# Patient Record
Sex: FEMALE | Race: WHITE | NOT HISPANIC OR LATINO
[De-identification: names, ages, dates, MRNs, and addresses within clinical notes are randomized per-mention and may not be internally consistent; named-entity substitution may affect disease eponyms.]

---

## 2024-10-15 SDOH — ECONOMIC STABILITY: TRANSPORTATION INSECURITY
IN THE PAST 12 MONTHS, HAS LACK OF RELIABLE TRANSPORTATION KEPT YOU FROM MEDICAL APPOINTMENTS, MEETINGS, WORK OR FROM GETTING THINGS NEEDED FOR DAILY LIVING?: NO

## 2024-10-15 SDOH — ECONOMIC STABILITY: FOOD INSECURITY: WITHIN THE PAST 12 MONTHS, THE FOOD YOU BOUGHT JUST DIDN'T LAST AND YOU DIDN'T HAVE MONEY TO GET MORE.: SOMETIMES TRUE

## 2024-10-15 SDOH — ECONOMIC STABILITY: FOOD INSECURITY: WITHIN THE PAST 12 MONTHS, YOU WORRIED THAT YOUR FOOD WOULD RUN OUT BEFORE YOU GOT MONEY TO BUY MORE.: OFTEN TRUE

## 2024-10-15 SDOH — HEALTH STABILITY: PHYSICAL HEALTH: ON AVERAGE, HOW MANY MINUTES DO YOU ENGAGE IN EXERCISE AT THIS LEVEL?: 90 MIN

## 2024-10-15 SDOH — ECONOMIC STABILITY: INCOME INSECURITY: IN THE LAST 12 MONTHS, WAS THERE A TIME WHEN YOU WERE NOT ABLE TO PAY THE MORTGAGE OR RENT ON TIME?: YES

## 2024-10-15 SDOH — ECONOMIC STABILITY: TRANSPORTATION INSECURITY
IN THE PAST 12 MONTHS, HAS THE LACK OF TRANSPORTATION KEPT YOU FROM MEDICAL APPOINTMENTS OR FROM GETTING MEDICATIONS?: NO

## 2024-10-15 SDOH — HEALTH STABILITY: PHYSICAL HEALTH: ON AVERAGE, HOW MANY DAYS PER WEEK DO YOU ENGAGE IN MODERATE TO STRENUOUS EXERCISE (LIKE A BRISK WALK)?: 4 DAYS

## 2024-10-15 SDOH — ECONOMIC STABILITY: INCOME INSECURITY: HOW HARD IS IT FOR YOU TO PAY FOR THE VERY BASICS LIKE FOOD, HOUSING, MEDICAL CARE, AND HEATING?: VERY HARD

## 2024-10-15 SDOH — HEALTH STABILITY: MENTAL HEALTH
STRESS IS WHEN SOMEONE FEELS TENSE, NERVOUS, ANXIOUS, OR CAN'T SLEEP AT NIGHT BECAUSE THEIR MIND IS TROUBLED. HOW STRESSED ARE YOU?: RATHER MUCH

## 2024-10-15 SDOH — ECONOMIC STABILITY: TRANSPORTATION INSECURITY
IN THE PAST 12 MONTHS, HAS LACK OF TRANSPORTATION KEPT YOU FROM MEETINGS, WORK, OR FROM GETTING THINGS NEEDED FOR DAILY LIVING?: NO

## 2024-10-15 SDOH — ECONOMIC STABILITY: HOUSING INSECURITY
IN THE LAST 12 MONTHS, WAS THERE A TIME WHEN YOU DID NOT HAVE A STEADY PLACE TO SLEEP OR SLEPT IN A SHELTER (INCLUDING NOW)?: YES

## 2024-10-15 ASSESSMENT — LIFESTYLE VARIABLES
HOW MANY STANDARD DRINKS CONTAINING ALCOHOL DO YOU HAVE ON A TYPICAL DAY: 3 OR 4
HOW OFTEN DO YOU HAVE A DRINK CONTAINING ALCOHOL: 2-4 TIMES A MONTH
SKIP TO QUESTIONS 9-10: 0
HOW OFTEN DO YOU HAVE SIX OR MORE DRINKS ON ONE OCCASION: LESS THAN MONTHLY
AUDIT-C TOTAL SCORE: 4

## 2024-10-15 ASSESSMENT — SOCIAL DETERMINANTS OF HEALTH (SDOH)
HOW OFTEN DO YOU ATTEND CHURCH OR RELIGIOUS SERVICES?: NEVER
DO YOU BELONG TO ANY CLUBS OR ORGANIZATIONS SUCH AS CHURCH GROUPS UNIONS, FRATERNAL OR ATHLETIC GROUPS, OR SCHOOL GROUPS?: NO
HOW OFTEN DO YOU ATTEND CHURCH OR RELIGIOUS SERVICES?: NEVER
WITHIN THE PAST 12 MONTHS, YOU WORRIED THAT YOUR FOOD WOULD RUN OUT BEFORE YOU GOT THE MONEY TO BUY MORE: OFTEN TRUE
HOW MANY DRINKS CONTAINING ALCOHOL DO YOU HAVE ON A TYPICAL DAY WHEN YOU ARE DRINKING: 3 OR 4
IN A TYPICAL WEEK, HOW MANY TIMES DO YOU TALK ON THE PHONE WITH FAMILY, FRIENDS, OR NEIGHBORS?: THREE TIMES A WEEK
HOW OFTEN DO YOU GET TOGETHER WITH FRIENDS OR RELATIVES?: ONCE A WEEK
HOW OFTEN DO YOU GET TOGETHER WITH FRIENDS OR RELATIVES?: ONCE A WEEK
HOW OFTEN DO YOU ATTENT MEETINGS OF THE CLUB OR ORGANIZATION YOU BELONG TO?: NEVER
HOW OFTEN DO YOU ATTENT MEETINGS OF THE CLUB OR ORGANIZATION YOU BELONG TO?: NEVER
HOW OFTEN DO YOU HAVE SIX OR MORE DRINKS ON ONE OCCASION: LESS THAN MONTHLY
DO YOU BELONG TO ANY CLUBS OR ORGANIZATIONS SUCH AS CHURCH GROUPS UNIONS, FRATERNAL OR ATHLETIC GROUPS, OR SCHOOL GROUPS?: NO
IN A TYPICAL WEEK, HOW MANY TIMES DO YOU TALK ON THE PHONE WITH FAMILY, FRIENDS, OR NEIGHBORS?: THREE TIMES A WEEK
IN THE PAST 12 MONTHS, HAS THE ELECTRIC, GAS, OIL, OR WATER COMPANY THREATENED TO SHUT OFF SERVICE IN YOUR HOME?: YES
HOW OFTEN DO YOU HAVE A DRINK CONTAINING ALCOHOL: 2-4 TIMES A MONTH
HOW HARD IS IT FOR YOU TO PAY FOR THE VERY BASICS LIKE FOOD, HOUSING, MEDICAL CARE, AND HEATING?: VERY HARD

## 2024-10-18 ENCOUNTER — OFFICE VISIT (OUTPATIENT)
Dept: MEDICAL GROUP | Facility: CLINIC | Age: 44
End: 2024-10-18
Payer: COMMERCIAL

## 2024-10-18 VITALS
TEMPERATURE: 97.7 F | SYSTOLIC BLOOD PRESSURE: 140 MMHG | OXYGEN SATURATION: 97 % | DIASTOLIC BLOOD PRESSURE: 79 MMHG | BODY MASS INDEX: 20.72 KG/M2 | WEIGHT: 132 LBS | HEIGHT: 67 IN | HEART RATE: 84 BPM

## 2024-10-18 DIAGNOSIS — Z12.31 ENCOUNTER FOR SCREENING MAMMOGRAM FOR MALIGNANT NEOPLASM OF BREAST: ICD-10-CM

## 2024-10-18 DIAGNOSIS — M54.50 CHRONIC MIDLINE LOW BACK PAIN WITHOUT SCIATICA: ICD-10-CM

## 2024-10-18 DIAGNOSIS — F99 MENTAL HEALTH DISORDER: ICD-10-CM

## 2024-10-18 DIAGNOSIS — Z80.9 FAMILY HISTORY OF CANCER: ICD-10-CM

## 2024-10-18 DIAGNOSIS — Z11.59 ENCOUNTER FOR HEPATITIS C SCREENING TEST FOR LOW RISK PATIENT: ICD-10-CM

## 2024-10-18 DIAGNOSIS — G89.29 CHRONIC MIDLINE LOW BACK PAIN WITHOUT SCIATICA: ICD-10-CM

## 2024-10-18 PROCEDURE — 99214 OFFICE O/P EST MOD 30 MIN: CPT | Mod: GC | Performed by: BEHAVIOR ANALYST

## 2024-10-18 RX ORDER — OXCARBAZEPINE 600 MG/1
600 TABLET, FILM COATED ORAL 2 TIMES DAILY
COMMUNITY

## 2024-10-18 RX ORDER — VALACYCLOVIR HYDROCHLORIDE 1 G/1
1000 TABLET, FILM COATED ORAL 2 TIMES DAILY
COMMUNITY

## 2024-10-18 RX ORDER — NAPROXEN 500 MG/1
500 TABLET ORAL 2 TIMES DAILY WITH MEALS
COMMUNITY

## 2024-10-18 RX ORDER — DROSPIRENONE AND ETHINYL ESTRADIOL 0.02-3(28)
1 KIT ORAL DAILY
COMMUNITY

## 2024-10-18 RX ORDER — PROPRANOLOL HCL 20 MG
TABLET ORAL
COMMUNITY
Start: 2023-01-01

## 2024-10-18 RX ORDER — TRETINOIN 0.25 MG/G
CREAM TOPICAL NIGHTLY
COMMUNITY

## 2024-10-18 RX ORDER — CLONAZEPAM 0.5 MG/1
TABLET ORAL 2 TIMES DAILY
COMMUNITY

## 2024-10-18 RX ORDER — BUSPIRONE HYDROCHLORIDE 7.5 MG/1
7.5 TABLET ORAL 3 TIMES DAILY
COMMUNITY

## 2024-10-18 RX ORDER — BENZTROPINE MESYLATE 0.5 MG/1
0.5 TABLET ORAL 2 TIMES DAILY
COMMUNITY

## 2024-10-18 RX ORDER — CYCLOBENZAPRINE HCL 10 MG
10 TABLET ORAL 3 TIMES DAILY PRN
COMMUNITY

## 2024-10-18 RX ORDER — HYDROXYZINE HYDROCHLORIDE 25 MG/1
25 TABLET, FILM COATED ORAL 3 TIMES DAILY PRN
COMMUNITY

## 2024-10-18 RX ORDER — TRAZODONE HYDROCHLORIDE 50 MG/1
50 TABLET, FILM COATED ORAL NIGHTLY
COMMUNITY

## 2024-10-18 ASSESSMENT — PATIENT HEALTH QUESTIONNAIRE - PHQ9
CLINICAL INTERPRETATION OF PHQ2 SCORE: 2
SUM OF ALL RESPONSES TO PHQ QUESTIONS 1-9: 10
5. POOR APPETITE OR OVEREATING: 1 - SEVERAL DAYS

## 2024-12-06 ENCOUNTER — OFFICE VISIT (OUTPATIENT)
Dept: MEDICAL GROUP | Facility: CLINIC | Age: 44
End: 2024-12-06
Payer: COMMERCIAL

## 2024-12-06 VITALS
HEART RATE: 96 BPM | DIASTOLIC BLOOD PRESSURE: 74 MMHG | SYSTOLIC BLOOD PRESSURE: 112 MMHG | WEIGHT: 129.5 LBS | OXYGEN SATURATION: 99 % | HEIGHT: 67 IN | TEMPERATURE: 97.4 F | BODY MASS INDEX: 20.33 KG/M2

## 2024-12-06 DIAGNOSIS — Z30.9 ENCOUNTER FOR CONTRACEPTIVE MANAGEMENT, UNSPECIFIED TYPE: ICD-10-CM

## 2024-12-06 PROCEDURE — 99213 OFFICE O/P EST LOW 20 MIN: CPT | Mod: GE | Performed by: BEHAVIOR ANALYST

## 2024-12-06 PROCEDURE — 3074F SYST BP LT 130 MM HG: CPT | Mod: GC | Performed by: BEHAVIOR ANALYST

## 2024-12-06 PROCEDURE — 3078F DIAST BP <80 MM HG: CPT | Mod: GC | Performed by: BEHAVIOR ANALYST

## 2024-12-06 RX ORDER — DROSPIRENONE AND ETHINYL ESTRADIOL 0.02-3(28)
1 KIT ORAL DAILY
Qty: 28 TABLET | Refills: 11 | Status: SHIPPED | OUTPATIENT
Start: 2024-12-06

## 2024-12-06 RX ORDER — GABAPENTIN 300 MG/1
600 CAPSULE ORAL 2 TIMES DAILY
COMMUNITY
Start: 2024-09-23

## 2024-12-06 NOTE — PROGRESS NOTES
"Subjective:     CC: refill BC    HPI:   Giovanna is a 44 y.o. female who presents today for below concern.     Assessment:  Problem   Encounter for Contraceptive Management    -pt req refill on birth control  -has no concerns  -not recently sexually active  -periods well regulated on BC  -last pap in past year    Plan:  -refill BC         All plans of care were fully discussed with the patient and shared-decision making was utilized to conclude best course of actions in patient's medical management.    ROS: See HPI.     Objective:     Exam:  /74   Pulse 96   Temp 36.3 °C (97.4 °F) (Temporal)   Ht 1.702 m (5' 7\")   Wt 58.7 kg (129 lb 8 oz)   SpO2 99%   BMI 20.28 kg/m²  Body mass index is 20.28 kg/m².    Physical Exam:  General: Pt resting in NAD, cooperative   Skin:  No cyanosis or jaundice   HEENT: NC/AT. EOMI. No conjunctival injection or sclera icterus.   Extremities:  No LE edema   CNS:  No gross focal neurologic deficits  Psych: Appropriate mood and affect         Assessment & Plan:     See A/P under Subjective Section above    Problem List Items Addressed This Visit       Encounter for contraceptive management    Relevant Medications    drospirenone-ethinyl estradiol (JASE) 3-0.02 MG per tablet               "

## 2024-12-11 ENCOUNTER — RESEARCH ENCOUNTER (OUTPATIENT)
Dept: RESEARCH | Facility: MEDICAL CENTER | Age: 44
End: 2024-12-11

## 2024-12-11 NOTE — RESEARCH NOTE
Patient has been referred by Scott Church. The initial referral follow-up message, which includes the consent form link, has been sent to the patient.    Eligible Studies: HNP

## 2025-01-08 ENCOUNTER — TELEMEDICINE (OUTPATIENT)
Dept: BEHAVIORAL HEALTH | Facility: CLINIC | Age: 45
End: 2025-01-08
Payer: COMMERCIAL

## 2025-01-08 DIAGNOSIS — F31.71 BIPOLAR DISORDER, IN PARTIAL REMISSION, MOST RECENT EPISODE HYPOMANIC (HCC): ICD-10-CM

## 2025-01-08 DIAGNOSIS — F43.12 PROLONGED POSTTRAUMATIC STRESS DISORDER: ICD-10-CM

## 2025-01-08 PROCEDURE — 90791 PSYCH DIAGNOSTIC EVALUATION: CPT | Performed by: PSYCHOLOGIST

## 2025-01-09 PROBLEM — F43.12 PROLONGED POSTTRAUMATIC STRESS DISORDER: Status: ACTIVE | Noted: 2025-01-09
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
## 2025-02-03 ENCOUNTER — APPOINTMENT (OUTPATIENT)
Dept: BEHAVIORAL HEALTH | Facility: CLINIC | Age: 45
End: 2025-02-03
Payer: COMMERCIAL

## 2025-02-03 DIAGNOSIS — F43.12 PROLONGED POSTTRAUMATIC STRESS DISORDER: ICD-10-CM

## 2025-02-03 DIAGNOSIS — F31.71 BIPOLAR DISORDER, IN PARTIAL REMISSION, MOST RECENT EPISODE HYPOMANIC (HCC): ICD-10-CM

## 2025-02-04 NOTE — PROGRESS NOTES
Name: Giovanna Fonseca Date: 2/3/25 : 82 Time in session 60 minutes   [] Initial Dx Eval [] Family [] Cancelled/Rescheduled [] Office visit   [x] Individual [] Group [] Late Cancellation [] Virtual Session   [] Couple [] Testing [] No call/No show [] Late   [] Discharge [] Phone [x] On time: 3:00 PM []  (2)   Attended: Ms. Fonseca   Observations/ Appearance/ Affect: Ms. Fonseca appeared clean, well-groomed, and dressed in casual clothes. She was oriented to person, place, time, and purpose, was pleasant and cooperative, lucid, and articulate. Her affect was anxious, and mood was sad. No suicidal or homicidal ideation described or reported. No reports of hallucinations or confusions. She participated well in this session.   Content/ Topics: Ms. Fonseca reported that she was doing well. She reported that she was talking with an old boyfriend, and they were thinking about getting back together. She reported that he lives in [place]. She reported that she maintained her sobriety. She reported that she was interested in using EMDR.   Risk issues assessed: Discussed with Ms. Fonseca strategies to decrease anxiety and increase safety. Discussed how Polyvagal Theory explains how there are shifts in physiological and behavioral states that distort social awareness and establish habitual defensive reactions that replace appropriate spontaneous social behavior. Discussed with her how over the next several sessions she will learn how the body is wired for survival and connection. Discussed how the autonomic nervous system works as our personal surveillance system, always on guard, always alert to the question: “is it safe?” The work of the autonomic nervous system is to protect us by looking for cues of safety and risk, sensing moment to moment of what is happening in and around our bodies and in the connections, we have to others. Discussed how over the next several sessions we will talk more about how this system  works and can inform us about how the state of the nervous system sets the table for how we will respond, act, and feel. Discussed with this person how ventral exercises help a person to be in a safe ventral state and be mindful. Discussed how a deep breath, a sigh, and a stretch are examples of ventral exercises and self-regulation. Encouraged her to be aware of her feelings of safety and connection here at [facility].   Psychosocial and environmental problems addressed: Discussed with Ms. Fonseca about Eye Movement Desensitization and Reprocessing (EMDR) as an approach to overcoming anxiety, stress, and trauma (Jennifer Javier, 1979). Discussed EMDR and how it was an Information Processing Therapy that used an eight-phase approach. Discussed how in the first phase history taking sessions are done to assess her readiness for EMDR and to develop a treatment plan. Discussed how the work would be to identify possible targets for EMDR processing. These include recent distressing events, current situations that elicit emotional disturbance, related historical incidents, and the development of specific skills and behaviors that will be needed by her in future situations. Discussed how during the second phase of treatment, this psychologist will work to ensure that she has adequate methods of handling emotional distress and good coping skills, and that she is in a relatively stable state. If further stabilization is required, or if additional skills are needed, therapy focuses on providing these. Discussed how she would then be able to use stress reducing techniques whenever necessary, during or between sessions. However, one goal is not to need these techniques once therapy is complete. Discussed how in phase three through six, a target is identified and processed using EMDR procedures. These involve her identifying the most vivid visual image related to the memory (if available), a negative belief about self, related  emotions, and body sensations. She would also identify a preferred positive belief. The validity of the positive belief is rated, as is the intensity of the negative emotions. In phase seven, closure, this psychologist would ask her to keep a journal during the week to document any related material that may arise and remind her of the self-calming activities that were mastered in phase two. Discussed how the next session begins with phase eight, re-evaluation of the previous work, and of progress since the previous session. Discussed how EMDR treatment ensures processing of all related historical events, current incidents that elicit distress and future scenarios that will require different responses. The overall goal is producing the most comprehensive and profound treatment effects in the shortest period of time, while simultaneously maintaining a stable client within a balanced system. Discussed how after EMDR processing, clients generally report that the emotional distress related to the memory has been eliminated, or greatly decreased, and that they have gained important cognitive insights. Importantly, these emotional and cognitive changes usually result in spontaneous behavioral and personal change, which are further enhanced with standard EMDR procedures. EMDR could be helpful. Before EMDR can be used she must gain a thorough understanding of this approach. Discussed how she must first identify and learn to access positive resources in her life, develop a safe place exercise using relaxation and stress reduction exercises. Discussed how EMDR was a client centered approach.   Current GAF: 55   Current medications: JASE, Neurontin   Significant/ Recent Events: Discussed with Ms. Fonseca how she might consider working in “The Relaxation and Stress Reduction Workbook, 7th edition” by Nathalie Hurtado, Kianna Humphries and Jamie Chavez. Discussed how this workbook teaches clinically proven stress-management and  relaxation techniques. Discussed how these skills are effective if they are practiced daily as part of your lifestyle. Discussed how the vagus nerve helps to regulate the heart and works to speed up or slow down our heart rate and effectively respond to any given moment. Discussed how this is called the vagal brake. It is this interaction between the parasympathetic and sympathetic nervous systems. Parasympathetic energy keeps the nervous system charged and the brake allows for more of the sympathetic energy into the system. Discussed how our breath helps us manage this brake. Using guided imagery to find and access a Safe Place is a way of exercising control of the vagal brake through relaxing breathing. Anabel Espinoza reminds us that every breath cycle is the vagal brake at work. By using your Safe Place, you are connecting to your connection with this brake and its built-in pattern of relaxation on the inhalation and reengagement on the exhalation. This braking action brings flexibility to our responses and a sense of ease to transitions. Discussed how affirmations were helpful and skillful thoughts that constructively and effectively change the tone and purpose of an action. Described how affirmations were brief, positively worded statements in the “present tense.” Discussed how by staying calm she was able to safely connect to others. As a relaxation exercise, she used bilateral stimulation and the imagery of a cleansing vortex to decrease tensions, negative thoughts and feelings and initiate the relaxation response. She was encouraged to develop her safe place and her positive resources that she would use to distract herself from any disturbing material, to use her vagal brake, to use mindful self-compassion daily and to use her affirmations to gain restful sleep, eat healthy, and to use affirmations easily. In that state of relaxation, using bilateral stimulation, discussed how she would focus on her breathing as a  meditation to relax, and was reminded how breathing is the fundamental necessity of life that most people take for granted. She used bilateral stimulation and practiced diaphragmatic or abdominal breathing, focusing on her breath for three minutes in this session, to let go of tension and lower anxieties. She agreed to practice diaphragmatic breathing by focusing on her breath at least three minutes a day and especially at the first sign of nervousness or anxiety. Reminded her how beginning with an exhale will allow the lungs to open up and create the feeling that that there is plenty of room to take in a good breath at the first intake of air. Inhaling and exhaling through the nose will slow it down and prevent hyperventilation. Make sure your exhale is longer than your inhale. Remember slow, deep, abdominal breaths (Yandel Hurtado and Scott). Discussed the work of Cee Benjamin and encouraged her to make some of her recipes in her book, “Eat Right, Feel Right.” She has recipes that reduce inflammation which occurs with depression, support vital energy (especially the adrenal glands), and reduce fatigue. Discussed how Cee reminds us that we need nourishment to pay attention to and to focus for sustained periods. Discussed how Cee reminds us that difficulty sleeping is not just about difficulty falling asleep, but it may be about waking up in the middle of the night. She has some recipes that can help with each. Discussed how Cee has recipes in her book that would encourage her to eat foods that are calming and to eliminate foods that are stimulating. She reminds people that food preparation is one of the greatest self-care efforts we can make. Discussed the work of Alina Mckenna and her workbook, “Mindful Self-Compassion” and to exercise mindful self-compassion as needed. Encouraged her to learn more about Polyvagal Theory and to consider the work of Anabel Espinoza. Discussed looking at [email or web address] and considering  nutritional supplements to increase the nutritional values in her diet. Discussed how Polyvagal Theory, Positive Psychology (Mindful Self-Compassion), Cognitive Behavioral Theory, Information Processing Theory, Relaxation and Stress Reduction Research, and Mental Health Nutrition would inform this psychotherapy and how she will develop Cognitive Behavioral Skills and Strategies to decrease and manage anxieties and fears, lift depression, gain control over her life, develop a healthier lifestyle, increase restful sleep, and find meaning and balance in her life. She asked to return to [facility] weekly to develop this supportive psychotherapy. Discussed how this psychotherapy would focus on her health, wellbeing, and progress at her pace and in a positive direction.   Informed consent issues discussed: Ms. Fonseca reported that she understood the process of this evaluation and agreed to return to [facility]. Discussed how she expressed her desire to change and was willing to start the process. She reported that she was willing to make changes to her life toward a healthy lifestyle. She reported that she was aware of the problems she experienced and expressed the desire to solve those problems safely. She reported that she had a positive outlook for change. She reported that she had family and friends that were supportive and knew she needed to gain and maintain a healthy network of support.    Assignments/ Homework: Ms. Fonseca agreed to initiate some of the strategies discussed today to decrease anxieties and increase restful sleep and increase nutritional values in her diet.    Plan: Ms. Fonseca agreed to monitor closely her mood and behavior.    Diagnoses: F31.9 Bipolar Disorder (by history): F43.12 Posttraumatic Stress Disorder [] Provisional   Treatment Plan: [x] Continued [] New [] Replaced Referral:   Suicidal [] Yes [x] No [] Medical:    Violence: [] Yes [x] No [] Psychiatric:    Next session:     [] Neurological:             Rad Moya Psy.D.  Clinical Psychologist  [facility]  NPI: [id number]

## 2025-02-27 ENCOUNTER — APPOINTMENT (OUTPATIENT)
Dept: RADIOLOGY | Facility: MEDICAL CENTER | Age: 45
End: 2025-02-27
Attending: BEHAVIOR ANALYST
Payer: COMMERCIAL

## 2025-02-27 ENCOUNTER — APPOINTMENT (OUTPATIENT)
Dept: BEHAVIORAL HEALTH | Facility: CLINIC | Age: 45
End: 2025-02-27
Payer: COMMERCIAL

## 2025-02-27 DIAGNOSIS — Z12.31 ENCOUNTER FOR SCREENING MAMMOGRAM FOR MALIGNANT NEOPLASM OF BREAST: ICD-10-CM

## 2025-02-27 PROCEDURE — 77067 SCR MAMMO BI INCL CAD: CPT

## 2025-02-28 ENCOUNTER — RESEARCH ENCOUNTER (OUTPATIENT)
Dept: RESEARCH | Facility: MEDICAL CENTER | Age: 45
End: 2025-02-28
Payer: COMMERCIAL

## 2025-02-28 DIAGNOSIS — Z00.6 RESEARCH STUDY PATIENT: ICD-10-CM

## 2025-03-06 ENCOUNTER — HOSPITAL ENCOUNTER (OUTPATIENT)
Facility: MEDICAL CENTER | Age: 45
End: 2025-03-06
Attending: BEHAVIOR ANALYST
Payer: COMMERCIAL

## 2025-03-06 ENCOUNTER — HOSPITAL ENCOUNTER (OUTPATIENT)
Facility: MEDICAL CENTER | Age: 45
End: 2025-03-06
Attending: FAMILY MEDICINE

## 2025-03-06 DIAGNOSIS — Z11.59 ENCOUNTER FOR HEPATITIS C SCREENING TEST FOR LOW RISK PATIENT: ICD-10-CM

## 2025-03-06 DIAGNOSIS — Z00.6 RESEARCH STUDY PATIENT: ICD-10-CM

## 2025-03-06 LAB — HCV AB SER QL: NORMAL

## 2025-03-06 PROCEDURE — 86803 HEPATITIS C AB TEST: CPT

## 2025-03-06 PROCEDURE — 36415 COLL VENOUS BLD VENIPUNCTURE: CPT

## 2025-03-13 ENCOUNTER — APPOINTMENT (OUTPATIENT)
Dept: BEHAVIORAL HEALTH | Facility: CLINIC | Age: 45
End: 2025-03-13
Payer: COMMERCIAL

## 2025-03-17 ENCOUNTER — RESULTS FOLLOW-UP (OUTPATIENT)
Dept: MEDICAL GROUP | Facility: PHYSICIAN GROUP | Age: 45
End: 2025-03-17
Payer: COMMERCIAL

## 2025-03-17 LAB
APOB+LDLR+PCSK9 GENE MUT ANL BLD/T: NOT DETECTED
BRCA1+BRCA2 DEL+DUP + FULL MUT ANL BLD/T: NOT DETECTED
MLH1+MSH2+MSH6+PMS2 GN DEL+DUP+FUL M: NOT DETECTED

## 2025-03-28 ENCOUNTER — APPOINTMENT (OUTPATIENT)
Dept: BEHAVIORAL HEALTH | Facility: CLINIC | Age: 45
End: 2025-03-28
Payer: COMMERCIAL

## 2025-03-28 DIAGNOSIS — F31.71 BIPOLAR DISORDER, IN PARTIAL REMISSION, MOST RECENT EPISODE HYPOMANIC (HCC): ICD-10-CM

## 2025-03-28 DIAGNOSIS — F43.12 PROLONGED POSTTRAUMATIC STRESS DISORDER: ICD-10-CM

## 2025-03-28 PROCEDURE — 90837 PSYTX W PT 60 MINUTES: CPT | Performed by: PSYCHOLOGIST

## 2025-03-31 NOTE — PROGRESS NOTES
Name: Giovanna Fonseca Date: 3/28/25 : 82 Time in session 60 minutes   [] Initial Dx Eval [] Family [] Cancelled/Rescheduled [] Office visit   [x] Individual [] Group [] Late Cancellation [] Virtual Session   [] Couple [] Testing [] No call/No show [] Late   [] Discharge [] Phone [x] On time: 3:00 PM []  (3)   Attended: Ms. Fonseca   Observations/ Appearance/ Affect: Ms. Fonseca appeared clean, well-groomed, and dressed in casual clothes. She was oriented to person, place, time, and purpose, was pleasant and cooperative, lucid, and articulate. Her affect was anxious, and mood was sad. No suicidal or homicidal ideation described or reported. No reports of hallucinations or confusions. She participated well in this session.   Content/ Topics: Ms. Fonseca reported that she was doing well. She reported that was stressed by family problems. 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 She reported that she had a terrible sinus infection for a while but was better now. She reported that she was working and doing okay at work.   Risk issues assessed: Discussed with Ms. Fonseca strategies to decrease anxiety and increase safety. Discussed how Polyvagal Theory explains how there are shifts in physiological and behavioral states that distort social awareness and establish habitual defensive reactions that replace appropriate spontaneous social behavior. Discussed with her how the body is wired for survival and connection. Discussed how the autonomic nervous system works as our personal surveillance system, always on guard, always alert to the question: “is it safe?” The work of the autonomic nervous system is to protect us by looking for cues of safety and risk, sensing moment to moment of what is happening in and around our bodies and in the connections, we have to others. Discussed how this system works and can inform us about how the state of the nervous system  sets the table for how we will respond, act, and feel. Discussed with Ms. Fonseca how ventral exercises help a person to be in a safe ventral state and be mindful. Discussed how a deep breath, a sigh, and a stretch are examples of ventral exercises and self-regulation. Encouraged her to be aware of her feelings of safety and connection here at [facility].   Psychosocial and environmental problems addressed: Discussed how EMDR treatment ensures processing of all related historical events, current incidents that elicit distress and future scenarios that will require different responses. The overall goal is producing the most comprehensive and profound treatment effects in the shortest period of time, while simultaneously maintaining a stable client within a balanced system. Discussed creating a “safe place” in her mind where she could safely imagine and rid herself of any disturbance and where she would feel 100% safe. She reported that she had a place in mind. She described it being on her [place]parent's beach in [place], [place]. Discussed developing Positive Resources that she would use to calm and relax herself and distract herself from any disturbing material. She reported that she would play with her cat, listen to music, dance, go for a walk, make something (her hobbies), make a video, clean something, or cook something. She reported that she wanted to use EMDR as an approach to safely decreasing anxieties and posttraumatic stress. This person agreed to practice a meditation out of, “The Relaxation and Stress Reduction Workbook, 7th edition” by Nathalie Hurtado, Kianna Humphries and Jamie Chavez. Discussed how this workbook teaches clinically proven stress-management and relaxation techniques. Discussed how these skills are effective if they are practiced daily as part of your lifestyle. She was informed that breathing can reduce or eliminate symptoms of stress. Breath awareness and good breathing habits will  enhance psychological and physical well-being. She was informed how breathing works and how it is vital to sustaining life. Discussed how she would practice abdominal or diaphragmatic breathing and how it is natural, rhythmic, and relaxing, becomes deeper, and slower and is the easiest way to elicit the relaxation response. This person used bilateral stimulation and used the imagery of a cleansing vortex to relax and rid herself of any tension, tightness, or unusual sensations. Then she engaged in a breathing meditation the asked her to focus her thoughts on her breathing. She focused on her breathing and used breathing to increase awareness of her inner experiences, to release tensions and relax. She practiced this breathing meditation for 5 minutes. Reminded her how the relaxation response it the opposite of the stress response. By engaging in diaphragmatic breathing and initiating the relaxation response, the state of relaxation dismantles the stress response. Discussed how meditation is the practice of slowing down and focusing the mind on the present moment. Meditation generally takes place in a seated position with the eyes closed. The practice can last for as little as 5 minutes or go on for hours. Regardless of the time limit, the purpose of the practice is to prevent the mind from wandering. She focused on her breathing and on her wellbeing. In this state of relaxation she enhanced her safe place (on her grandparent's beach in [place], on a jasmyn, warm afternoon, a green flag day, listening to seagulls and waves breaking on the shore break) and her positive resources (she would use to calm and relax herself and distract herself from any disturbing material. She reported that she would play with her cat, listen to music, dance, go for a walk, make something (her hobbies), make a video, clean something or cook something), to distract herself from any disturbing material, to use her ventral exercises, to  use mindful self-compassion daily and to use her affirmations to gain restful sleep, eat healthy, and to use this breathing meditation often.   Current GAF: 55   Current medications: JASE, Neurontin   Significant/ Recent Events: Discussed the work of Cee Benjamin and encouraged her to make some of her recipes in her book, “Eat Right, Feel Right.” She has recipes that reduce inflammation which occurs with depression, support vital energy (especially the adrenal glands), and reduce fatigue. Discussed how Cee reminds us that we need nourishment to pay attention to and to focus for sustained periods. Discussed how Cee reminds us that difficulty sleeping is not just about difficulty falling asleep, but it may be about waking up in the middle of the night. She has some recipes that can help with each. Discussed how Cee has recipes in her book that would encourage her to eat foods that are calming and to eliminate foods that are stimulating. She reminds people that food preparation is one of the greatest self-care efforts we can make. Discussed the work of Alina Mckenna and her workbook, “Mindful Self-Compassion” and to exercise mindful self-compassion as needed. Encouraged her to learn more about Polyvagal Theory and to consider the work of Anabel Espinoza. Discussed looking at [employer] and considering nutritional supplements to increase the nutritional values in her diet. Discussed how Polyvagal Theory, Positive Psychology (Mindful Self-Compassion), Cognitive Behavioral Theory, Information Processing Theory, Relaxation and Stress Reduction Research, and Mental Health Nutrition would inform this psychotherapy and how she will develop Cognitive Behavioral Skills and Strategies to decrease and manage anxieties and fears, lift depression, gain control over her life, develop a healthier lifestyle, increase restful sleep, and find meaning and balance in her life. She asked to return to [facility] weekly to develop this supportive  psychotherapy. Discussed how this psychotherapy would focus on her health, wellbeing, and progress at her pace and in a positive direction.   Informed consent issues discussed: Ms. Fonseca reported that she understood the process of this evaluation and agreed to return to [facility]. Discussed how she expressed her desire to change and was willing to start the process. She reported that she was willing to make changes to her life toward a healthy lifestyle. She reported that she was aware of the problems she experienced and expressed the desire to solve those problems safely. She reported that she had a positive outlook for change. She reported that she had family and friends that were supportive and knew she needed to gain and maintain a healthy network of support.    Assignments/ Homework: Ms. Fonseca agreed to initiate some of the strategies discussed today to decrease anxieties and increase restful sleep and increase nutritional values in her diet.    Plan: Ms. Fonseca agreed to monitor closely her mood and behavior.    Diagnoses: F31.9 Bipolar Disorder (by history): F43.12 Posttraumatic Stress Disorder [] Provisional   Treatment Plan: [x] Continued [] New [] Replaced Referral:   Suicidal [] Yes [x] No [] Medical:    Violence: [] Yes [x] No [] Psychiatric:    Next session:     [] Neurological:            Rad Moya Psy.D.  Clinical Psychologist  [facility]  NPI: [id number]

## 2025-04-17 ENCOUNTER — APPOINTMENT (OUTPATIENT)
Dept: URGENT CARE | Facility: CLINIC | Age: 45
End: 2025-04-17
Payer: COMMERCIAL

## 2025-04-17 ENCOUNTER — APPOINTMENT (OUTPATIENT)
Dept: TELEHEALTH | Facility: TELEMEDICINE | Age: 45
End: 2025-04-17
Payer: COMMERCIAL

## 2025-05-13 ENCOUNTER — OFFICE VISIT (OUTPATIENT)
Dept: BEHAVIORAL HEALTH | Facility: CLINIC | Age: 45
End: 2025-05-13
Payer: COMMERCIAL

## 2025-05-13 DIAGNOSIS — F31.71 BIPOLAR DISORDER, IN PARTIAL REMISSION, MOST RECENT EPISODE HYPOMANIC (HCC): Primary | ICD-10-CM

## 2025-05-13 DIAGNOSIS — F43.12 PROLONGED POSTTRAUMATIC STRESS DISORDER: ICD-10-CM

## 2025-05-13 PROCEDURE — 90837 PSYTX W PT 60 MINUTES: CPT | Performed by: PSYCHOLOGIST

## 2025-05-14 NOTE — PROGRESS NOTES
Name: Giovanna Fonseca Date: 25 : 81 Time in session 60 minutes   [] Initial Dx Eval [] Family [] Cancelled/Rescheduled [] Office visit   [x] Individual [] Group [] Late Cancellation [] Virtual Session   [] Couple [] Testing [] No call/No show [] Late   [] Discharge [] Phone [x] On time: 4:00 PM []  (4)   Attended: Ms. Fonseca   Observations/ Appearance/ Affect: Ms. Fonseca appeared clean, well-groomed, and dressed in casual clothes. She was oriented to person, place, time, and purpose was pleasant and cooperative, lucid, and articulate. Her affect was anxious, and mood was sad. No suicidal or homicidal ideation described or reported. No reports of hallucinations or confusions. She participated well in this session.   Content/ Topics: Ms. Fonseca reported that she was doing well. 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 She reported that she broke up with the dylan in [place] and was dating a person here in [place]. She reported that he was a good person, and he helped her get this job. She reported that she went down to [place] to visit with his friends. She reported that she was using her safe place and her positive resources. She reported that she was ready to start EMDR.   Risk issues assessed: Discussed with Ms. Fonseca strategies to decrease anxiety and increase safety. Discussed how Polyvagal Theory explains how there are shifts in physiological and behavioral states that distort social awareness and establish habitual defensive reactions that replace appropriate spontaneous social behavior. Discussed with her how the body is wired for survival and connection. Discussed how the autonomic nervous system works as our personal surveillance system, always on guard, always alert to the question: “is it safe?” The work of the autonomic nervous system is to protect us by looking for cues of safety and risk, sensing moment to moment of what is  happening in and around our bodies and in the connections, we have to others. Discussed how this system works and can inform us about how the state of the nervous system sets the table for how we will respond, act, and feel. Discussed with Ms. Fonseca, how ventral exercises help a person to be in a safe ventral state and be mindful. Discussed how a deep breath, a sigh, and a stretch are examples of ventral exercises and self-regulation. Encouraged her to be aware of her feelings of safety and connection here at [facility].   Psychosocial and environmental problems addressed: Ms. Fonseca expressed her interest in using Eye Movement Desensitization and Reprocessing (EMDR). Discussed her readiness to use EMDR. She reported that her life was stable and secure. She reported that she had close friends and felt safe in her community. She reported that she is able to identify and express feelings confidently. She reported that she has an easy temperament, was flexible and has self-confidence and self-worth. She reported that she felt safe and generally marvin well with adversity. She reported that she does not use alcohol. She reported that she was in good health. Using the Negative Cognition Questionnaire, she identified what Negative Cognitions describe negative beliefs she had about herself. She identified several: “I do not deserve love,” “I am a bad person,” “I am terrible,” “I am worthless,” “I am shameful,” “I am not lovable” “I am not good enough,” “I am stupid (not smart enough),” “I am insignificant,” “I am a disappointment,” “I deserve to die,” “I deserve to be miserable,” “I am ugly (my body is hateful),” “I did something wrong,” “I am in danger,” “It is not okay to feel (show) my emotions,” “I should have known better,” “I am inadequate,” “I deserve only bad things,” “I am not in control,” “I am powerless,” “I am a failure (will fail),” “I have to be perfect (please everyone),” “I am permanently damaged,” “I should  have done something,” “I cannot stand it,” “I do not deserve,” “I cannot stand up for myself,” “I am different (do not belong),” “I am weak,” “I cannot protect myself,” “I cannot get what I want,” “I cannot trust anyone,” “I cannot trust anyone,” and “I cannot let it out.”  She reported that when she thought about how an ex-boyfriend abandoned her, the words that go best with that incident that expresses her negative belief about herself now were, “I do not deserve love.” She reported that the emotions she feels now when she brings up that incident and those words, she felt anxiety, worthless, shameful, worry, and not lovable. She reported that when she brings up that incident and those words and those emotions, she feels them, “all over.” She reported that on a scale of 0 to 10, where 0 is no disturbance or neutral and 10 is the highest disturbance that she can imagine, how disturbing this feels to her right now, she reported that it was a 10. She reported that those words, “I do not deserve love,” reminded her of when another ex-boyfriend cheated on her. She reported that the emotions she feels now when she brings up that incident and those words, she felt anxiety, worthless, shameful, worry, and not lovable. She reported that when she brings up that incident and those words and those emotions, she feels them, “all over.” She reported that on a scale of 0 to 10, where 0 is no disturbance or neutral and 10 is the highest disturbance that she can imagine, how disturbing this feels to her right now, she reported that it was a 10. Discussed how this seemed an appropriate time to stop. Discussed how she worked hard to identify these negative cognitions and these events. Discussed how EMDR treatment ensures processing of all related historical events, current incidents that elicit distress and future scenarios that will require different responses. The overall goal is producing the most comprehensive and profound  treatment effects in the shortest period of time, while simultaneously maintaining a stable client within a balanced system. Discussed how after EMDR processing, clients generally report that the emotional distress related to the memory has been eliminated, or greatly decreased, and that they have gained important cognitive insights. Importantly, these emotional and cognitive changes usually result in spontaneous behavioral and personal change, which are further enhanced with standard EMDR procedures. Ms. Fonseca used bilateral stimulation and used the imagery of a cleansing vortex to relax and rid herself of any tension, tightness, or unusual sensations, any negative feeling, and any negative thoughts. She used bilateral stimulation to practice her breathing meditation to engage the relaxation response. She was reminded how breathing can reduce or eliminate symptoms of stress. Breath awareness and good breathing habits will enhance psychological and physical well-being. She was informed how breathing works and how it is vital to sustaining life. She practiced abdominal or diaphragmatic breathing and reminded her that it was natural, rhythmic, and relaxing, how it becomes progressively deeper and slower and is the easiest way to elicit the relaxation response. She focused on her breathing and used breathing to increase awareness of her inner experiences, to release tensions and relax. Reminded her how the relaxation response is the opposite of the stress response and by eliciting the relaxation response it dismantles the stress response. She focused on her breathing for five minutes, on her safe place (on her grandparent's beach in [place], on a jasmyn, warm afternoon, a green flag day, listening to seagulls and waves breaking on the shore break) and then on her positive resources (she would play with her cat, listen to music, dance, go for a walk, make something (her hobbies), make a video, clean something or  cook something) to distract herself from any disturbing material. She was reminded to use Mindful Self-Compassion and her affirmations and focus on her safety, her health and wellbeing.   Current GAF: 55   Current medications: JASE, Neurontin   Significant/ Recent Events: Discussed the work of Cee Sourav and encouraged her to make some of her recipes in her book, “Eat Right, Feel Right.” She has recipes that reduce inflammation which occurs with depression, support vital energy (especially the adrenal glands), and reduce fatigue. Discussed how Cee reminds us that we need nourishment to pay attention to and to focus for sustained periods. Discussed how Cee reminds us that difficulty sleeping is not just about difficulty falling asleep, but it may be about waking up in the middle of the night. She has some recipes that can help with each. Discussed how Cee has recipes in her book that would encourage her to eat foods that are calming and to eliminate foods that are stimulating. She reminds people that food preparation is one of the greatest self-care efforts we can make. Discussed the work of Alina Mckenna and her workbook, “Mindful Self-Compassion” and to exercise mindful self-compassion as needed. Encouraged her to learn more about Polyvagal Theory and to consider the work of Anabel Espinoza. Discussed looking at Brainmd.com and considering nutritional supplements to increase the nutritional values in her diet. Discussed how Polyvagal Theory, Positive Psychology (Mindful Self-Compassion), Cognitive Behavioral Theory, Information Processing Theory, Relaxation and Stress Reduction Research, and Mental Health Nutrition would inform this psychotherapy and how she will develop Cognitive Behavioral Skills and Strategies to decrease and manage anxieties and fears, lift depression, gain control over her life, develop a healthier lifestyle, increase restful sleep, and find meaning and balance in her life. She asked to  return to [facility] weekly to develop this supportive psychotherapy. Discussed how this psychotherapy would focus on her health, wellbeing, and progress at her pace and in a positive direction.   Informed consent issues discussed: Ms. Fonseca reported that she understood the process of this evaluation and agreed to return to [facility]. Discussed how she expressed her desire to change and was willing to start the process. She reported that she was willing to make changes to her life toward a healthy lifestyle. She reported that she was aware of the problems she experienced and expressed the desire to solve those problems safely. She reported that she had a positive outlook for change. She reported that she had family and friends that were supportive and knew she needed to gain and maintain a healthy network of support.    Assignments/ Homework: Ms. Fonseca agreed to initiate some of the strategies discussed today to decrease anxieties and increase restful sleep and increase nutritional values in her diet.    Plan: Ms. Fonseca agreed to monitor closely her mood and behavior.    Diagnoses: F31.9 Bipolar Disorder (by history): F43.12 Posttraumatic Stress Disorder [] Provisional   Treatment Plan: [x] Continued [] New [] Replaced Referral:   Suicidal [] Yes [x] No [] Medical:    Violence: [] Yes [x] No [] Psychiatric:    Next session:     [] Neurological:            Rad Moya Psy.D.  Clinical Psychologist  [facility]  NPI: [id number]

## 2025-05-27 ENCOUNTER — OFFICE VISIT (OUTPATIENT)
Dept: BEHAVIORAL HEALTH | Facility: CLINIC | Age: 45
End: 2025-05-27
Payer: COMMERCIAL

## 2025-05-27 DIAGNOSIS — F31.71 BIPOLAR DISORDER, IN PARTIAL REMISSION, MOST RECENT EPISODE HYPOMANIC (HCC): Primary | ICD-10-CM

## 2025-05-27 DIAGNOSIS — F43.12 PROLONGED POSTTRAUMATIC STRESS DISORDER: ICD-10-CM

## 2025-05-27 PROCEDURE — 90837 PSYTX W PT 60 MINUTES: CPT | Performed by: PSYCHOLOGIST

## 2025-05-28 NOTE — PROGRESS NOTES
Name: Giovanna Fonseca Date: 25 : 81 Time in session 60 minutes   [] Initial Dx Eval [] Family [] Cancelled/Rescheduled [] Office visit   [x] Individual [] Group [] Late Cancellation [] Virtual Session   [] Couple [] Testing [] No call/No show [] Late   [] Discharge [] Phone [x] On time: 4:00 PM []  (5)   Attended: Ms. Fonseca   Observations/ Appearance/ Affect: Ms. Fonseca appeared clean, well-groomed, and dressed in casual clothes. She was oriented to person, place, time, and purpose was pleasant and cooperative, lucid, and articulate. Her affect was anxious, and mood was sad. No suicidal or homicidal ideation described or reported. No reports of hallucinations or confusions. She participated well in this session.   Content/ Topics: Ms. Fonseca reported that she was doing okay. She reported that she experienced some mood swings over the past week. She reported that she forgot to use her safe place, but did use her positive resources. She reported that she wanted to try to identify a few more events from her past today.   Risk issues assessed: Discussed with Ms. Fonseca strategies to decrease anxiety and increase safety. Discussed how Polyvagal Theory explains how there are shifts in physiological and behavioral states that distort social awareness and establish habitual defensive reactions that replace appropriate spontaneous social behavior. Discussed with her how the body is wired for survival and connection. Discussed how the autonomic nervous system works as our personal surveillance system, always on guard, always alert to the question: “is it safe?” The work of the autonomic nervous system is to protect us by looking for cues of safety and risk, sensing moment to moment of what is happening in and around our bodies and in the connections, we have to others. Discussed how this system works and can inform us about how the state of the nervous system sets the table for how we will respond, act,  and feel. Discussed with Ms. Fonseca, how ventral exercises help a person to be in a safe ventral state and be mindful. Discussed how a deep breath, a sigh, and a stretch are examples of ventral exercises and self-regulation. Encouraged her to be aware of her feelings of safety and connection here at [facility].   Psychosocial and environmental problems addressed: Ms. Fonseca seemed anxious, and this psychologist suggested she would go to her safe place (on her grandparent's beach in [place], on a jasmyn, warm afternoon, a green flag day, listening to seagulls and waves breaking on the shore break), before she begins. She appeared tearful and struggled to gain her safe place. Discussed how in this session she would review and rehearse diaphragmatic breathing, her safe place, and positive resources, and remind herself to use ventral exercises to stay safe, to use mindful self-compassion, and use her affirmations. Reminded her how EMDR treatment ensures processing of all related historical events, current incidents that elicit distress and future scenarios that will require different responses. The overall goal is producing the most comprehensive and profound treatment effects in the shortest period of time, while simultaneously maintaining a stable client within a balanced system. Discussed how after EMDR processing, clients generally report that the emotional distress related to the memory has been eliminated, or greatly decreased, and that they have gained important cognitive insights. Importantly, these emotional and cognitive changes usually result in spontaneous behavioral and personal change, which are further enhanced with standard EMDR procedures. Reminded her that this psychotherapy goes forward safely at her pace. Ms. Fonseca used bilateral stimulation and used the imagery of a cleansing vortex to relax and rid herself of any tension, tightness, or unusual sensations, any negative feeling, and any negative  thoughts. She practiced breathing meditation and diaphragmatic breathing to engage the relaxation response. She was reminded how breathing can reduce or eliminate symptoms of stress. Breath awareness and good breathing habits will enhance psychological and physical well-being. She was informed how breathing works and how it is vital to sustaining life. She practiced abdominal or diaphragmatic breathing and reminded her that it was natural, rhythmic, and relaxing, how it becomes progressively deeper and slower and is the easiest way to elicit the relaxation response. She focused on her breathing and used breathing to increase awareness of her inner experiences, to release tensions and relax. Reminded her how the relaxation response is the opposite of the stress response and by eliciting the relaxation response it dismantles the stress response. She focused on her breathing for five minutes, on her safe place (on her grandparent's beach in [place], on a jasmyn, warm afternoon, a green flag day, listening to seagulls and waves breaking on the shore break) and then on her positive resources (she would play with her cat, listen to music, dance, go for a walk, make something (her hobbies), make a video, clean something or cook something) to distract herself from any disturbing material. She was reminded to use Mindful Self-Compassion and her affirmations and focus on her safety, her health and wellbeing. She reported that she felt better.   Current GAF: 55   Current medications: JASE, Neurontin   Significant/ Recent Events: Discussed with Ms. Fonseca The [employer] Performance Program in Sports (TOPPS), and how it is an award-winning applied research and training program focused on developing, evaluating, and training performance optimization programs for athletes. The program incorporates support systems from teammates, coaches, and/or family members. Goal-achievement in both sports and overall mental health/wellness is  conceptualized through optimal regulation of thoughts, emotions, and behaviors that are consistent with a positive outlook and state of well-being. Approaching mental wellness holistically, the optimization programs at [facility] are developed to be applicable to all aspects and levels of life performance for athletes with specialized skill sets in unique cultures. Although the program is focused on athletes, it is applicable to artists, business professionals, first responders, and others interested in optimizing their performance. [facility] has been successfully implemented in both recreational and elite populations, including world and national champions in sports, and professional artists. This psychologist found it helpful in motivating positive change in anyone by using the tool: the Motivational [place]. When we use Performance Optimization Theory the focus is on “Optimal thinking, doing, and perceiving” in the best way possible given available skills and resources while considering environmental demands. Discussed how when a person needs to do something they can use this technique to make it more likely they would get it done. The person asks themselves what they would like to do and then asks what they can do to optimize their effort to make it happen. This is called the focus statement. This statement is then sandwiched between two motivational statements. That is, find things that you can say to yourself to motivate you (motivational statements). Discussed how you can talk to yourself into optimizing your efforts by making your focused statement in between two motivational statements (the motivational sandwich). She reported that she understood this strategy and she would try it out. Discussed the work of Cee Benjamin and encouraged her to make some of her recipes in her book, “Eat Right, Feel Right.” She has recipes that reduce inflammation which occurs with depression, support vital energy (especially the  adrenal glands), and reduce fatigue. Discussed how Cee reminds us that we need nourishment to pay attention to and to focus for sustained periods. Discussed how Cee reminds us that difficulty sleeping is not just about difficulty falling asleep, but it may be about waking up in the middle of the night. She has some recipes that can help with each. Discussed how Cee has recipes in her book that would encourage her to eat foods that are calming and to eliminate foods that are stimulating. She reminds people that food preparation is one of the greatest self-care efforts we can make. Discussed the work of Alina Mckenna and her workbook, “Mindful Self-Compassion” and to exercise mindful self-compassion as needed. Encouraged her to learn more about Polyvagal Theory and to consider the work of Anabel Espinoza. Discussed looking at Brainmd.com and considering nutritional supplements to increase the nutritional values in her diet. Discussed how Polyvagal Theory, Positive Psychology (Mindful Self-Compassion), Cognitive Behavioral Theory, Information Processing Theory, Relaxation and Stress Reduction Research, and Mental Health Nutrition would inform this psychotherapy and how she will develop Cognitive Behavioral Skills and Strategies to decrease and manage anxieties and fears, lift depression, gain control over her life, develop a healthier lifestyle, increase restful sleep, and find meaning and balance in her life. She asked to return to [facility] weekly to develop this supportive psychotherapy. Discussed how this psychotherapy would focus on her health, wellbeing, and progress at her pace and in a positive direction.   Informed consent issues discussed: Ms. Fonseca reported that she understood the process of this evaluation and agreed to return to [facility]. Discussed how she expressed her desire to change and was willing to start the process. She reported that she was willing to make changes to her life toward a healthy lifestyle.  She reported that she was aware of the problems she experienced and expressed the desire to solve those problems safely. She reported that she had a positive outlook for change. She reported that she had family and friends that were supportive and knew she needed to gain and maintain a healthy network of support.    Assignments/ Homework: Ms. Fonseca agreed to initiate some of the strategies discussed today to decrease anxieties and increase restful sleep and increase nutritional values in her diet.    Plan: Ms. Fonseca agreed to monitor closely her mood and behavior.    Diagnoses: F31.9 Bipolar Disorder (by history): F43.12 Posttraumatic Stress Disorder [] Provisional   Treatment Plan: [x] Continued [] New [] Replaced Referral:   Suicidal [] Yes [x] No [] Medical:    Violence: [] Yes [x] No [] Psychiatric:    Next session:     [] Neurological:            Rad Moya Psy.D.  Clinical Psychologist  [facility]  NPI: [id number]   [facility] Pregnancy And Lactation Text: This medication has not been assigned a Pregnancy Risk Category. It is unknown if the medication is excreted in breast milk.

## 2025-06-10 ENCOUNTER — APPOINTMENT (OUTPATIENT)
Dept: BEHAVIORAL HEALTH | Facility: CLINIC | Age: 45
End: 2025-06-10
Payer: COMMERCIAL

## 2025-07-14 ENCOUNTER — APPOINTMENT (OUTPATIENT)
Dept: MEDICAL GROUP | Facility: CLINIC | Age: 45
End: 2025-07-14
Payer: COMMERCIAL

## 2025-07-17 ENCOUNTER — APPOINTMENT (OUTPATIENT)
Dept: MEDICAL GROUP | Facility: MEDICAL CENTER | Age: 45
End: 2025-07-17
Payer: COMMERCIAL

## 2025-07-24 ENCOUNTER — OFFICE VISIT (OUTPATIENT)
Dept: MEDICAL GROUP | Facility: CLINIC | Age: 45
End: 2025-07-24
Payer: COMMERCIAL

## 2025-07-24 ENCOUNTER — OFFICE VISIT (OUTPATIENT)
Dept: BEHAVIORAL HEALTH | Facility: CLINIC | Age: 45
End: 2025-07-24
Payer: COMMERCIAL

## 2025-07-24 VITALS
WEIGHT: 129.4 LBS | OXYGEN SATURATION: 96 % | HEIGHT: 67 IN | TEMPERATURE: 98 F | SYSTOLIC BLOOD PRESSURE: 104 MMHG | BODY MASS INDEX: 20.31 KG/M2 | DIASTOLIC BLOOD PRESSURE: 73 MMHG | HEART RATE: 66 BPM

## 2025-07-24 DIAGNOSIS — Z23 NEED FOR VACCINATION: ICD-10-CM

## 2025-07-24 DIAGNOSIS — Z12.31 ENCOUNTER FOR SCREENING MAMMOGRAM FOR MALIGNANT NEOPLASM OF BREAST: ICD-10-CM

## 2025-07-24 DIAGNOSIS — M54.50 CHRONIC MIDLINE LOW BACK PAIN WITHOUT SCIATICA: ICD-10-CM

## 2025-07-24 DIAGNOSIS — F43.12 PROLONGED POSTTRAUMATIC STRESS DISORDER: ICD-10-CM

## 2025-07-24 DIAGNOSIS — G89.29 CHRONIC MIDLINE LOW BACK PAIN WITHOUT SCIATICA: ICD-10-CM

## 2025-07-24 DIAGNOSIS — N95.1 PERIMENOPAUSAL SYMPTOMS: ICD-10-CM

## 2025-07-24 DIAGNOSIS — F31.71 BIPOLAR DISORDER, IN PARTIAL REMISSION, MOST RECENT EPISODE HYPOMANIC (HCC): Primary | ICD-10-CM

## 2025-07-24 DIAGNOSIS — Z00.00 ENCOUNTER FOR SCREENING AND PREVENTATIVE CARE: Primary | ICD-10-CM

## 2025-07-24 PROCEDURE — 90472 IMMUNIZATION ADMIN EACH ADD: CPT

## 2025-07-24 PROCEDURE — 3074F SYST BP LT 130 MM HG: CPT | Mod: GC

## 2025-07-24 PROCEDURE — 90837 PSYTX W PT 60 MINUTES: CPT | Performed by: PSYCHOLOGIST

## 2025-07-24 PROCEDURE — 90471 IMMUNIZATION ADMIN: CPT

## 2025-07-24 PROCEDURE — 90746 HEPB VACCINE 3 DOSE ADULT IM: CPT | Mod: JZ

## 2025-07-24 PROCEDURE — 99214 OFFICE O/P EST MOD 30 MIN: CPT | Mod: 25,GC

## 2025-07-24 PROCEDURE — 90651 9VHPV VACCINE 2/3 DOSE IM: CPT

## 2025-07-24 PROCEDURE — 3078F DIAST BP <80 MM HG: CPT | Mod: GC
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
## 2025-07-25 PROBLEM — Z00.00 ENCOUNTER FOR SCREENING AND PREVENTATIVE CARE: Status: ACTIVE | Noted: 2024-12-06

## 2025-07-25 PROBLEM — N95.1 PERIMENOPAUSAL SYMPTOMS: Chronic | Status: ACTIVE | Noted: 2025-07-25

## 2025-07-25 PROBLEM — N95.1 PERIMENOPAUSAL SYMPTOMS: Status: ACTIVE | Noted: 2025-07-25

## 2025-07-25 RX ORDER — MELOXICAM 7.5 MG/1
15 TABLET ORAL DAILY
Qty: 60 TABLET | Refills: 0 | Status: SHIPPED | OUTPATIENT
Start: 2025-07-25
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
## 2025-07-26 NOTE — ASSESSMENT & PLAN NOTE
Patient with mammogram last year, category 2, no gross evidence of malignancy but breasts are heterogeneously dense. Patient tested negative for BRCA1/2. She does have a family history of breast cancer in mother.   -Ordered mammogram, will follow up on results

## 2025-07-26 NOTE — ASSESSMENT & PLAN NOTE
Patient reports history of anemia, prediabetes, and high cholesterol. No labs in chart, all completed in [place]. She also had her last cervical cancer screening in [place] 2 years ago, reportedly normal. Hep C NR.   -Records release form signed today  -Ordered CBC, CMP, TSH, lipid panel, HbA1c, HIV

## 2025-07-26 NOTE — ASSESSMENT & PLAN NOTE
Chronic. Decreased libido, dyspareunia, moodiness. No hot flashes, new fatigue, or irregularities in menstrual cycle. Patient requesting hormonal testing.   -Discussed that she is on Yennifer, and hormonal testing would not be beneficial at this time  -If symptoms progress, patient is interested in menopause clinic, will provide referral at that time  -Discussed trial of topical lubricants and moisturizers

## 2025-07-30 NOTE — Clinical Note
REFERRAL APPROVAL NOTICE         Sent on July 30, 2025                   Geetha Fonseca  [address]  [place] [address]                   Dear Ms. Reynagalauracitlaly,    After a careful review of the medical information and benefit coverage, [facility] has processed your referral. See below for additional details.    If applicable, you must be actively enrolled with your insurance for coverage of the authorized service. If you have any questions regarding your coverage, please contact your insurance directly.    REFERRAL INFORMATION   Referral #:  [id number]  Referred-To Department    Referred-By Provider:  Physical Therapy    Vera Clay D.O.   [facility] [address]      [address]  [place] [address] [phone] [address]  [address]  [place] [id number] [phone]    Referral Start Date:  07/25/2025  Referral End Date:   07/25/2026             SCHEDULING  If you do not already have an appointment, please call [phone] to make an appointment.     MORE INFORMATION  If you do not already have a Discourse account, sign up at: [email or web address]  You can access your medical information, make appointments, see lab results, billing information, and more.  If you have questions regarding this referral, please contact  the [facility] Referrals department at:             [phone]. Monday - Friday 8:00AM - 5:00PM.     Sincerely,    [facility]

## 2025-08-06 ENCOUNTER — HOSPITAL ENCOUNTER (OUTPATIENT)
Facility: MEDICAL CENTER | Age: 45
End: 2025-08-06
Payer: COMMERCIAL

## 2025-08-06 DIAGNOSIS — Z00.00 ENCOUNTER FOR SCREENING AND PREVENTATIVE CARE: ICD-10-CM

## 2025-08-06 LAB
ALBUMIN SERPL BCP-MCNC: 3.8 G/DL (ref 3.2–4.9)
ALBUMIN/GLOB SERPL: 1.2 G/DL
ALP SERPL-CCNC: 54 U/L (ref 30–99)
ALT SERPL-CCNC: 12 U/L (ref 2–50)
ANION GAP SERPL CALC-SCNC: 9 MMOL/L (ref 7–16)
AST SERPL-CCNC: 16 U/L (ref 12–45)
BILIRUB SERPL-MCNC: 0.3 MG/DL (ref 0.1–1.5)
BUN SERPL-MCNC: 12 MG/DL (ref 8–22)
CALCIUM ALBUM COR SERPL-MCNC: 9.6 MG/DL (ref 8.5–10.5)
CALCIUM SERPL-MCNC: 9.4 MG/DL (ref 8.5–10.5)
CHLORIDE SERPL-SCNC: 107 MMOL/L (ref 96–112)
CHOLEST SERPL-MCNC: 253 MG/DL (ref 100–199)
CO2 SERPL-SCNC: 22 MMOL/L (ref 20–33)
CREAT SERPL-MCNC: 0.85 MG/DL (ref 0.5–1.4)
ERYTHROCYTE [DISTWIDTH] IN BLOOD BY AUTOMATED COUNT: 42.3 FL (ref 35.9–50)
EST. AVERAGE GLUCOSE BLD GHB EST-MCNC: 111 MG/DL
GFR SERPLBLD CREATININE-BSD FMLA CKD-EPI: 86 ML/MIN/1.73 M 2
GLOBULIN SER CALC-MCNC: 3.2 G/DL (ref 1.9–3.5)
GLUCOSE SERPL-MCNC: 79 MG/DL (ref 65–99)
HBA1C MFR BLD: 5.5 % (ref 4–5.6)
HCT VFR BLD AUTO: 39.2 % (ref 37–47)
HDLC SERPL-MCNC: 83 MG/DL
HGB BLD-MCNC: 12.6 G/DL (ref 12–16)
HIV 1+2 AB+HIV1 P24 AG SERPL QL IA: NORMAL
LDLC SERPL CALC-MCNC: 137 MG/DL
MCH RBC QN AUTO: 29.6 PG (ref 27–33)
MCHC RBC AUTO-ENTMCNC: 32.1 G/DL (ref 32.2–35.5)
MCV RBC AUTO: 92.2 FL (ref 81.4–97.8)
PLATELET # BLD AUTO: 387 K/UL (ref 164–446)
PMV BLD AUTO: 9.6 FL (ref 9–12.9)
POTASSIUM SERPL-SCNC: 4.1 MMOL/L (ref 3.6–5.5)
PROT SERPL-MCNC: 7 G/DL (ref 6–8.2)
RBC # BLD AUTO: 4.25 M/UL (ref 4.2–5.4)
SODIUM SERPL-SCNC: 138 MMOL/L (ref 135–145)
TRIGL SERPL-MCNC: 166 MG/DL (ref 0–149)
TSH SERPL DL<=0.005 MIU/L-ACNC: 4.62 UIU/ML (ref 0.38–5.33)
WBC # BLD AUTO: 7.9 K/UL (ref 4.8–10.8)

## 2025-08-06 PROCEDURE — 85027 COMPLETE CBC AUTOMATED: CPT

## 2025-08-06 PROCEDURE — 36415 COLL VENOUS BLD VENIPUNCTURE: CPT

## 2025-08-06 PROCEDURE — 87389 HIV-1 AG W/HIV-1&-2 AB AG IA: CPT

## 2025-08-06 PROCEDURE — 83036 HEMOGLOBIN GLYCOSYLATED A1C: CPT

## 2025-08-06 PROCEDURE — 84443 ASSAY THYROID STIM HORMONE: CPT

## 2025-08-06 PROCEDURE — 80053 COMPREHEN METABOLIC PANEL: CPT

## 2025-08-06 PROCEDURE — 80061 LIPID PANEL: CPT

## 2025-08-21 ENCOUNTER — APPOINTMENT (OUTPATIENT)
Dept: PHARMACY | Facility: MEDICAL CENTER | Age: 45
End: 2025-08-21
Payer: COMMERCIAL

## 2025-08-25 ENCOUNTER — APPOINTMENT (OUTPATIENT)
Dept: BEHAVIORAL HEALTH | Facility: CLINIC | Age: 45
End: 2025-08-25
Payer: COMMERCIAL